# Patient Record
Sex: MALE | Race: BLACK OR AFRICAN AMERICAN | NOT HISPANIC OR LATINO
[De-identification: names, ages, dates, MRNs, and addresses within clinical notes are randomized per-mention and may not be internally consistent; named-entity substitution may affect disease eponyms.]

---

## 2021-03-27 ENCOUNTER — APPOINTMENT (OUTPATIENT)
Age: 46
End: 2021-03-27
Payer: COMMERCIAL

## 2021-03-27 PROCEDURE — 0001A: CPT

## 2021-04-13 PROBLEM — Z00.00 ENCOUNTER FOR PREVENTIVE HEALTH EXAMINATION: Status: ACTIVE | Noted: 2021-04-13

## 2021-04-17 ENCOUNTER — APPOINTMENT (OUTPATIENT)
Age: 46
End: 2021-04-17
Payer: COMMERCIAL

## 2021-04-17 PROCEDURE — 0002A: CPT

## 2022-12-02 ENCOUNTER — EMERGENCY (EMERGENCY)
Age: 47
LOS: 1 days | Discharge: HOME | End: 2022-12-02
Attending: EMERGENCY MEDICINE | Admitting: EMERGENCY MEDICINE
Payer: COMMERCIAL

## 2022-12-02 VITALS
TEMPERATURE: 98 F | HEART RATE: 88 BPM | RESPIRATION RATE: 14 BRPM | DIASTOLIC BLOOD PRESSURE: 82 MMHG | SYSTOLIC BLOOD PRESSURE: 146 MMHG | OXYGEN SATURATION: 98 %

## 2022-12-02 PROCEDURE — 99284 EMERGENCY DEPT VISIT MOD MDM: CPT

## 2022-12-02 RX ORDER — IBUPROFEN 200 MG
600 TABLET ORAL ONCE
Refills: 0 | Status: COMPLETED | OUTPATIENT
Start: 2022-12-02 | End: 2022-12-02

## 2022-12-02 RX ADMIN — Medication 600 MILLIGRAM(S): at 18:02

## 2022-12-02 NOTE — ED PROVIDER NOTE - PATIENT PORTAL LINK FT
You can access the FollowMyHealth Patient Portal offered by [facility] by registering at the following website: [email or web address]. By joining [facility] [facility] [employer] FollowMyHealth portal, you will also be able to view your health information using other applications (apps) compatible with our system.

## 2022-12-02 NOTE — ED PROVIDER NOTE - NS ED ATTENDING STATEMENT MOD
This was a shared visit with the KYREE. I reviewed and verified the documentation and independently performed the documented:
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
## 2022-12-02 NOTE — ED PROVIDER NOTE - ATTENDING APP SHARED VISIT CONTRIBUTION OF CARE
I performed a history and physical exam of the patient and discussed their management with the ACP. I reviewed the ACP's note and agree with the documented findings and plan of care.  Malena Pacheco MD
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
## 2022-12-02 NOTE — ED PROVIDER NOTE - NSFOLLOWUPINSTRUCTIONS_ED_ALL_ED_FT
Rest, drink plenty of fluids.  Advance activity as tolerated.  Continue all previously prescribed medications as directed.  Follow up with your primary care physician in 48-72 hours- bring copies of your results.  Return to the ER for worsening or persistent symptoms, and/or ANY NEW OR CONCERNING SYMPTOMS. If you have issues obtaining follow up, please call: [phone] to obtain a doctor or specialist who takes your insurance in your area.  You may call [phone] to make an appointment with the internal medicine clinic.

## 2023-10-27 ENCOUNTER — EMERGENCY (EMERGENCY)
Facility: HOSPITAL | Age: 48
LOS: 1 days | Discharge: HOME | End: 2023-10-27
Admitting: EMERGENCY MEDICINE
Payer: COMMERCIAL

## 2023-10-27 VITALS
DIASTOLIC BLOOD PRESSURE: 89 MMHG | SYSTOLIC BLOOD PRESSURE: 137 MMHG | OXYGEN SATURATION: 100 % | RESPIRATION RATE: 16 BRPM | TEMPERATURE: 98 F | HEART RATE: 80 BPM

## 2023-10-27 PROCEDURE — 99284 EMERGENCY DEPT VISIT MOD MDM: CPT

## 2023-10-27 RX ORDER — IBUPROFEN 200 MG
1 TABLET ORAL
Qty: 15 | Refills: 0
Start: 2023-10-27 | End: 2023-10-31

## 2023-10-27 RX ORDER — IBUPROFEN 200 MG
600 TABLET ORAL ONCE
Refills: 0 | Status: COMPLETED | OUTPATIENT
Start: 2023-10-27 | End: 2023-10-27

## 2023-10-27 RX ORDER — ACETAMINOPHEN 500 MG
650 TABLET ORAL ONCE
Refills: 0 | Status: COMPLETED | OUTPATIENT
Start: 2023-10-27 | End: 2023-10-27

## 2023-10-27 RX ADMIN — Medication 600 MILLIGRAM(S): at 13:41

## 2023-10-27 RX ADMIN — Medication 650 MILLIGRAM(S): at 13:42

## 2023-10-27 NOTE — ED ADULT NURSE NOTE - OBJECTIVE STATEMENT
Patient received in wellness, exam room 5. Patient A&Ox3 and ambulatory at baseline. Patient presents to the ED c/o dental pain for approximately 4 months. phx DM type 2. Patient states for approximately 4 months he has been experiencing tooth pain to his left upper molar; patient state he has been awaiting insurance approval to have a "deep cleaning" with his dentist. Patient states over the last three weeks, pain has become unbearable. Patient denies any bleeding or discharge from gums. Patient denies headache, dizziness, lightheadedness, nausea/vomiting, fever/chills. Patient offers no complaints at this time. Respirations even and unlabored, no signs/symptoms of acute distress; patient denies dyspnea, shortness of breath, and chest pain. Patient is stable at this time.

## 2023-10-27 NOTE — ED PROVIDER NOTE - PATIENT PORTAL LINK FT
You can access the FollowMyHealth Patient Portal offered by [facility] by registering at the following website: [email or web address]. By joining [facility]’s FollowMyHealth portal, you will also be able to view your health information using other applications (apps) compatible with our system.

## 2023-10-27 NOTE — ED PROVIDER NOTE - NSFOLLOWUPINSTRUCTIONS_ED_ALL_ED_FT
You were seen in the ED for dental pain.    Advance activity as tolerated. Continue all previously prescribed medications as directed unless otherwise instructed. For pain:  1. Take Motrin (also sold as Advil or Ibuprofen) 400-600 mg (two or three 200 mg over the counter pills) every 8 hours as needed for moderate pain or fevers-- take with food; do not take for more than 5 consecutive days.   2. Take Tylenol 650mg (Two 325 mg pills) or two 500mg extra strength every 6-8 hours as needed for pain or fevers.      Follow up with dental (call [phone] to make an appointment) in 48-72 hours.    Return to the ER for worsening or persistent symptoms, including but not limited to worsening/persistent pain, fevers, difficulty opening mouth, facial swelling, tongue swelling, difficulty breathing, difficulty swallowing, and/or ANY NEW OR CONCERNING SYMPTOMS.    Dental Cavities    AMBULATORY CARE:    Dental cavities, also called caries, are holes in teeth caused by bacteria. The bacteria mix with carbohydrates from foods and create acids. The acids break down areas of enamel, which covers the outside of a tooth.  Tooth Decay    Common signs and symptoms: You may not have any symptoms if cavities have just started to form. When cavities reach deeper parts of your tooth, you may start to feel pain. You may also have any of the following:    Pain when you chew or eat hot or cold foods    Chalky white, yellow, or brown tooth    Gum swelling  Seek care immediately if:    You have severe pain in your tooth or jaw.    You have swelling in your jaw or cheek.  Call your dentist if:    You have a fever.    Your tooth pain gets worse.    You have questions or concerns about your condition or care.  Treatment for dental cavities may include any of the following:    A fluoride treatment may be given during dental visits, or you may use products with fluoride at home. Your dentist will tell you what kind of fluoride you need and how to use it.    A filling may be placed in your tooth after the decayed portion is removed. The filling may help to protect your tooth from more decay.    A root canal may be needed if the tooth is infected or the decay is severe.  Prevent dental cavities:    Brush your teeth at least 2 times a day with fluoride toothpaste.    Use dental floss at least once a day to clean between your teeth.    See your dentist every 6 months for dental cleanings and oral exams.    Rinse your mouth with water or mouthwash after meals and snacks. Chew sugarless gum.    Eat a variety of healthy foods. Healthy foods include fruits, vegetables, whole-grain breads, low-fat dairy products, beans, lean meats, and fish. Avoid sugary and starchy food and drinks that can stick between your teeth.  Healthy Foods  Follow up with your dentist as directed: Write down your questions so you remember to ask them during your visits.

## 2023-10-27 NOTE — ED ADULT NURSE NOTE - NSFALLUNIVINTERV_ED_ALL_ED
Bed/Stretcher in lowest position, wheels locked, appropriate side rails in place/Call bell, personal items and telephone in reach/Instruct patient to call for assistance before getting out of bed/chair/stretcher/Non-slip footwear applied when patient is off stretcher/[place] to call system/Physically safe environment - no spills, clutter or unnecessary equipment/Purposeful proactive rounding/Room/bathroom lighting operational, light cord in reach

## 2023-10-27 NOTE — ED PROVIDER NOTE - CLINICAL SUMMARY MEDICAL DECISION MAKING FREE TEXT BOX
47 y/o m with pmhx of DM and HTN presents to the ED c/o dental pain x several months, worsening over the last 3 weeks.     Patient currently afebrile, hemodynamically stable, spO2 100%. Physical exam findings with tenderness over L upper molar tooth, with no signs of gingival swelling or redness. Based on history and physical, dx most consistent with dental caries. Low suspicion for TMJ disorder or dental abscess. No imaging or labs indicated at this time. Pain control and d/c home with referral to dental clinic.

## 2023-10-27 NOTE — ED ADULT NURSE NOTE - HIV OFFER
Pt would like an order for an  STD test     Please place orders   Previously Declined (within the last year)

## 2023-10-27 NOTE — ED PROVIDER NOTE - OBJECTIVE STATEMENT
49 y/o M with pmhx of HTN and DM presents to the ED c/o toothache x several months, worsening over the last 3 weeks. Pt reports pain in L upper molar tooth. Pt saw his dentist a month ago who recommended a deep cleaning, however was waiting for prior authorization from insurance company to get procedure done. Pt has been taking tylenol 500mg extra strength and ibuprofen 400mg for pain control with temporary relief of pain before pain recurs. Pt last took tylenol last night. Denies fever/chills, facial swelling, inability to open or close mouth, N/V. NKDA.

## 2023-10-27 NOTE — ED PROVIDER NOTE - PHYSICAL EXAMINATION
General: Well appearing in no acute distress, alert and cooperative  ENMT: Atraumatic external nose and ears, moist mucous membranes, oropharynx clear with no erythema. Tenderness to palpation over L upper molar (tooth #16), multiple missing teeth and dental caries noted. No signs of gingival inflammation or redness, No tenderness to palpation over oral mucosa, no flutuance or induration noted. No overlying facial swelling. No mastoid tenderness. No TMJ tenderness b/l.   Neck: Soft and supple, full ROM without pain, no midline tenderness, no thyromegaly,  no lymphadenopathy  Cardiac: Regular rate and regular rhythm, no murmurs, peripheral pulses 2+ and symmetric in all extremities, no LE edema.  Resp: Unlabored respiratory effort, lungs CTAB, speaking in full sentences, no wheezes  Abd: Soft, non-tender, non-distended, no guarding or rebound tenderness  MSK: Spine midline and non-tender, no CVA tenderness   Skin: Warm and dry, no rashes/abrasions/lacerations  Neuro: AO x 3, moves all extremities symmetrically, Motor strength 5/5 bilaterally UE and LE, sensation grossly intact  Psych: Mood and affect normal.

## 2023-10-27 NOTE — ED PROVIDER NOTE - PROGRESS NOTE DETAILS
CHIN Bolaños: Pt seen with the PA fellow. I evaluated the patient myself and agree with note. My findings are reflected throughout.
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
## 2025-03-13 ENCOUNTER — OFFICE (OUTPATIENT)
Dept: URBAN - METROPOLITAN AREA CLINIC 110 | Facility: CLINIC | Age: 50
Setting detail: OPHTHALMOLOGY
End: 2025-03-13
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
## 2025-03-13 PROCEDURE — NO SHOW FE NO SHOW FEE: Performed by: INTERNAL MEDICINE
